# Patient Record
Sex: FEMALE | Race: OTHER | URBAN - METROPOLITAN AREA
[De-identification: names, ages, dates, MRNs, and addresses within clinical notes are randomized per-mention and may not be internally consistent; named-entity substitution may affect disease eponyms.]

---

## 2018-01-25 ENCOUNTER — INPATIENT (INPATIENT)
Facility: HOSPITAL | Age: 58
LOS: 0 days | Discharge: HOME | DRG: 311 | End: 2018-01-26
Attending: INTERNAL MEDICINE | Admitting: INTERNAL MEDICINE
Payer: COMMERCIAL

## 2018-01-25 VITALS
SYSTOLIC BLOOD PRESSURE: 132 MMHG | HEART RATE: 69 BPM | DIASTOLIC BLOOD PRESSURE: 86 MMHG | RESPIRATION RATE: 20 BRPM | WEIGHT: 278 LBS | OXYGEN SATURATION: 97 % | HEIGHT: 62 IN | TEMPERATURE: 97 F

## 2018-01-25 DIAGNOSIS — E11.9 TYPE 2 DIABETES MELLITUS WITHOUT COMPLICATIONS: ICD-10-CM

## 2018-01-25 DIAGNOSIS — K21.9 GASTRO-ESOPHAGEAL REFLUX DISEASE WITHOUT ESOPHAGITIS: ICD-10-CM

## 2018-01-25 DIAGNOSIS — I20.0 UNSTABLE ANGINA: ICD-10-CM

## 2018-01-25 DIAGNOSIS — I10 ESSENTIAL (PRIMARY) HYPERTENSION: ICD-10-CM

## 2018-01-25 DIAGNOSIS — F17.210 NICOTINE DEPENDENCE, CIGARETTES, UNCOMPLICATED: ICD-10-CM

## 2018-01-25 DIAGNOSIS — Z90.49 ACQUIRED ABSENCE OF OTHER SPECIFIED PARTS OF DIGESTIVE TRACT: Chronic | ICD-10-CM

## 2018-01-25 DIAGNOSIS — Z96.659 PRESENCE OF UNSPECIFIED ARTIFICIAL KNEE JOINT: Chronic | ICD-10-CM

## 2018-01-25 LAB
ALBUMIN SERPL ELPH-MCNC: 3.9 G/DL — SIGNIFICANT CHANGE UP (ref 3.3–5)
ALP SERPL-CCNC: 71 U/L — SIGNIFICANT CHANGE UP (ref 40–120)
ALT FLD-CCNC: 17 U/L — SIGNIFICANT CHANGE UP (ref 10–45)
ANION GAP SERPL CALC-SCNC: 14 MMOL/L — SIGNIFICANT CHANGE UP (ref 5–17)
AST SERPL-CCNC: 15 U/L — SIGNIFICANT CHANGE UP (ref 10–40)
BASOPHILS NFR BLD AUTO: 0.5 % — SIGNIFICANT CHANGE UP (ref 0–2)
BILIRUB SERPL-MCNC: 0.2 MG/DL — SIGNIFICANT CHANGE UP (ref 0.2–1.2)
BUN SERPL-MCNC: 25 MG/DL — HIGH (ref 7–23)
CALCIUM SERPL-MCNC: 9.4 MG/DL — SIGNIFICANT CHANGE UP (ref 8.4–10.5)
CHLORIDE SERPL-SCNC: 97 MMOL/L — SIGNIFICANT CHANGE UP (ref 96–108)
CO2 SERPL-SCNC: 30 MMOL/L — SIGNIFICANT CHANGE UP (ref 22–31)
CREAT SERPL-MCNC: 1.05 MG/DL — SIGNIFICANT CHANGE UP (ref 0.5–1.3)
EOSINOPHIL NFR BLD AUTO: 1.4 % — SIGNIFICANT CHANGE UP (ref 0–6)
EXTRA BLUE TOP TUBE: SIGNIFICANT CHANGE UP
GLUCOSE SERPL-MCNC: 100 MG/DL — HIGH (ref 70–99)
HCT VFR BLD CALC: 42.4 % — SIGNIFICANT CHANGE UP (ref 34.5–45)
HGB BLD-MCNC: 14.1 G/DL — SIGNIFICANT CHANGE UP (ref 11.5–15.5)
LYMPHOCYTES # BLD AUTO: 23.6 % — SIGNIFICANT CHANGE UP (ref 13–44)
MCHC RBC-ENTMCNC: 29.4 PG — SIGNIFICANT CHANGE UP (ref 27–34)
MCHC RBC-ENTMCNC: 33.3 G/DL — SIGNIFICANT CHANGE UP (ref 32–36)
MCV RBC AUTO: 88.5 FL — SIGNIFICANT CHANGE UP (ref 80–100)
MONOCYTES NFR BLD AUTO: 7.8 % — SIGNIFICANT CHANGE UP (ref 2–14)
NEUTROPHILS NFR BLD AUTO: 66.7 % — SIGNIFICANT CHANGE UP (ref 43–77)
PLATELET # BLD AUTO: 269 K/UL — SIGNIFICANT CHANGE UP (ref 150–400)
POTASSIUM SERPL-MCNC: 3.6 MMOL/L — SIGNIFICANT CHANGE UP (ref 3.5–5.3)
POTASSIUM SERPL-SCNC: 3.6 MMOL/L — SIGNIFICANT CHANGE UP (ref 3.5–5.3)
PROT SERPL-MCNC: 7.8 G/DL — SIGNIFICANT CHANGE UP (ref 6–8.3)
RBC # BLD: 4.79 M/UL — SIGNIFICANT CHANGE UP (ref 3.8–5.2)
RBC # FLD: 13.1 % — SIGNIFICANT CHANGE UP (ref 10.3–16.9)
SODIUM SERPL-SCNC: 141 MMOL/L — SIGNIFICANT CHANGE UP (ref 135–145)
TROPONIN T SERPL-MCNC: <0.01 NG/ML — SIGNIFICANT CHANGE UP (ref 0–0.01)
WBC # BLD: 9.7 K/UL — SIGNIFICANT CHANGE UP (ref 3.8–10.5)
WBC # FLD AUTO: 9.7 K/UL — SIGNIFICANT CHANGE UP (ref 3.8–10.5)

## 2018-01-25 PROCEDURE — 93010 ELECTROCARDIOGRAM REPORT: CPT

## 2018-01-25 PROCEDURE — 99285 EMERGENCY DEPT VISIT HI MDM: CPT | Mod: 25

## 2018-01-25 PROCEDURE — 99222 1ST HOSP IP/OBS MODERATE 55: CPT | Mod: AI

## 2018-01-25 RX ORDER — SODIUM CHLORIDE 9 MG/ML
1000 INJECTION INTRAMUSCULAR; INTRAVENOUS; SUBCUTANEOUS
Qty: 0 | Refills: 0 | Status: DISCONTINUED | OUTPATIENT
Start: 2018-01-25 | End: 2018-01-26

## 2018-01-25 RX ORDER — HEPARIN SODIUM 5000 [USP'U]/ML
5000 INJECTION INTRAVENOUS; SUBCUTANEOUS ONCE
Qty: 0 | Refills: 0 | Status: COMPLETED | OUTPATIENT
Start: 2018-01-25 | End: 2018-01-25

## 2018-01-25 RX ORDER — HEPARIN SODIUM 5000 [USP'U]/ML
INJECTION INTRAVENOUS; SUBCUTANEOUS
Qty: 25000 | Refills: 0 | Status: DISCONTINUED | OUTPATIENT
Start: 2018-01-25 | End: 2018-01-26

## 2018-01-25 RX ORDER — INSULIN LISPRO 100/ML
VIAL (ML) SUBCUTANEOUS
Qty: 0 | Refills: 0 | Status: DISCONTINUED | OUTPATIENT
Start: 2018-01-25 | End: 2018-01-26

## 2018-01-25 RX ORDER — SODIUM CHLORIDE 9 MG/ML
1000 INJECTION, SOLUTION INTRAVENOUS
Qty: 0 | Refills: 0 | Status: DISCONTINUED | OUTPATIENT
Start: 2018-01-25 | End: 2018-01-26

## 2018-01-25 RX ORDER — HYDROCHLOROTHIAZIDE 25 MG
12.5 TABLET ORAL DAILY
Qty: 0 | Refills: 0 | Status: DISCONTINUED | OUTPATIENT
Start: 2018-01-25 | End: 2018-01-26

## 2018-01-25 RX ORDER — GLUCAGON INJECTION, SOLUTION 0.5 MG/.1ML
1 INJECTION, SOLUTION SUBCUTANEOUS ONCE
Qty: 0 | Refills: 0 | Status: DISCONTINUED | OUTPATIENT
Start: 2018-01-25 | End: 2018-01-26

## 2018-01-25 RX ORDER — CHLORTHALIDONE 50 MG
50 TABLET ORAL ONCE
Qty: 0 | Refills: 0 | Status: COMPLETED | OUTPATIENT
Start: 2018-01-25 | End: 2018-01-26

## 2018-01-25 RX ORDER — PANTOPRAZOLE SODIUM 20 MG/1
40 TABLET, DELAYED RELEASE ORAL
Qty: 0 | Refills: 0 | Status: DISCONTINUED | OUTPATIENT
Start: 2018-01-25 | End: 2018-01-26

## 2018-01-25 RX ORDER — NITROGLYCERIN 6.5 MG
0.4 CAPSULE, EXTENDED RELEASE ORAL ONCE
Qty: 0 | Refills: 0 | Status: COMPLETED | OUTPATIENT
Start: 2018-01-25 | End: 2018-01-25

## 2018-01-25 RX ORDER — SITAGLIPTIN 50 MG/1
1 TABLET, FILM COATED ORAL
Qty: 0 | Refills: 0 | COMMUNITY

## 2018-01-25 RX ORDER — TICAGRELOR 90 MG/1
180 TABLET ORAL ONCE
Qty: 0 | Refills: 0 | Status: COMPLETED | OUTPATIENT
Start: 2018-01-25 | End: 2018-01-25

## 2018-01-25 RX ORDER — NEBIVOLOL HYDROCHLORIDE 5 MG/1
10 TABLET ORAL ONCE
Qty: 0 | Refills: 0 | Status: COMPLETED | OUTPATIENT
Start: 2018-01-25 | End: 2018-01-26

## 2018-01-25 RX ORDER — HEPARIN SODIUM 5000 [USP'U]/ML
5000 INJECTION INTRAVENOUS; SUBCUTANEOUS EVERY 8 HOURS
Qty: 0 | Refills: 0 | Status: DISCONTINUED | OUTPATIENT
Start: 2018-01-25 | End: 2018-01-26

## 2018-01-25 RX ORDER — CHLORTHALIDONE 50 MG
1 TABLET ORAL
Qty: 0 | Refills: 0 | COMMUNITY

## 2018-01-25 RX ORDER — ESOMEPRAZOLE MAGNESIUM 40 MG/1
1 CAPSULE, DELAYED RELEASE ORAL
Qty: 0 | Refills: 0 | COMMUNITY

## 2018-01-25 RX ORDER — ASPIRIN/CALCIUM CARB/MAGNESIUM 324 MG
325 TABLET ORAL ONCE
Qty: 0 | Refills: 0 | Status: COMPLETED | OUTPATIENT
Start: 2018-01-25 | End: 2018-01-25

## 2018-01-25 RX ORDER — DEXTROSE 50 % IN WATER 50 %
25 SYRINGE (ML) INTRAVENOUS ONCE
Qty: 0 | Refills: 0 | Status: DISCONTINUED | OUTPATIENT
Start: 2018-01-25 | End: 2018-01-26

## 2018-01-25 RX ORDER — DEXTROSE 50 % IN WATER 50 %
12.5 SYRINGE (ML) INTRAVENOUS ONCE
Qty: 0 | Refills: 0 | Status: DISCONTINUED | OUTPATIENT
Start: 2018-01-25 | End: 2018-01-26

## 2018-01-25 RX ORDER — NEBIVOLOL HYDROCHLORIDE 5 MG/1
1 TABLET ORAL
Qty: 0 | Refills: 0 | COMMUNITY

## 2018-01-25 RX ORDER — DEXTROSE 50 % IN WATER 50 %
1 SYRINGE (ML) INTRAVENOUS ONCE
Qty: 0 | Refills: 0 | Status: DISCONTINUED | OUTPATIENT
Start: 2018-01-25 | End: 2018-01-26

## 2018-01-25 RX ORDER — LISINOPRIL 2.5 MG/1
20 TABLET ORAL DAILY
Qty: 0 | Refills: 0 | Status: DISCONTINUED | OUTPATIENT
Start: 2018-01-25 | End: 2018-01-26

## 2018-01-25 RX ADMIN — HEPARIN SODIUM 5000 UNIT(S): 5000 INJECTION INTRAVENOUS; SUBCUTANEOUS at 20:51

## 2018-01-25 RX ADMIN — HEPARIN SODIUM 1000 UNIT(S)/HR: 5000 INJECTION INTRAVENOUS; SUBCUTANEOUS at 20:52

## 2018-01-25 RX ADMIN — SODIUM CHLORIDE 75 MILLILITER(S): 9 INJECTION INTRAMUSCULAR; INTRAVENOUS; SUBCUTANEOUS at 21:41

## 2018-01-25 RX ADMIN — Medication 325 MILLIGRAM(S): at 20:25

## 2018-01-25 RX ADMIN — Medication 0.4 MILLIGRAM(S): at 20:25

## 2018-01-25 RX ADMIN — TICAGRELOR 180 MILLIGRAM(S): 90 TABLET ORAL at 20:26

## 2018-01-25 NOTE — H&P ADULT - PROBLEM SELECTOR PLAN 2
Monitor BP. Cont. lisinopril 20mg HCTZ 12.5mg PO daily, Bystolic 10mg and Chlorthaladone 50mg PO daily (will need clearance from attending and pharmacy in AM according to night pharmacist)  Bystolic and Chlorthaladone ordered by pharmacy one dose.  Please follow up.

## 2018-01-25 NOTE — H&P ADULT - PMH
GERD (gastroesophageal reflux disease)    HTN (hypertension)    Obesity    Pre-diabetes    Tobacco dependence due to cigarettes

## 2018-01-25 NOTE — ED PROVIDER NOTE - OBJECTIVE STATEMENT
57 yof pw cp x 1 wk, intermittent, unprovoked, sternal, rad to L arm, today had cp that waxes and wanes.  seen by PMD and cards, sent here for further eval.  hx of DM and HTN.  no prior stents or known CAD.  +sob.

## 2018-01-25 NOTE — H&P ADULT - REASON FOR ADMISSION
Unstable Angina symptoms X1 week. (Intermittent, non exertional chest pressure radiating down left arm associated with SOB.

## 2018-01-25 NOTE — H&P ADULT - NSHPSOCIALHISTORY_GEN_ALL_CORE
Single lives in [place];  Admits to tobacco use 1/2 pack a week X20 yrs.  Denies EtOH and illicit drug use.

## 2018-01-25 NOTE — ED PROVIDER NOTE - MEDICAL DECISION MAKING DETAILS
cp, likely unstable angina, d/w cardiology team, planned cath vs ct coronary in the AM, agree w/ brillinta and heparin, asa, nitro prn, admission (per Dr. Chavez)

## 2018-01-25 NOTE — H&P ADULT - HISTORY OF PRESENT ILLNESS
56 y/o obese female, current smoker, with PMHx of HTN, DM-2 and GERD presents to [facility] ER this evening, 1/25/18, complaining of intermittent, non exertional, mid sternal pressure radiating down left arm, with hand numbness/tingling associated with SOB X1 week.  According to patient, she has been experiencing intermittent, exertional chest pressure with lifting bags or walking a 4-5 blocks w/SOB for one month but has ignored her symptoms.  Last week symptoms are now occurring intermittently at rest with chest pressure now radiating down left arm with hand numbness and tingling sensation.  Pt. states today she was driving to work when on the car radio a physician was talking about symptoms to watch out for MI.  Pt. was nervous since she has been experiencing same symptoms and called her PMD who then advised her to go ER for evaluation.  Pt. reports last stress test was done one year ago because she needed cardiac clearance for gastric sleeve and results were normal.  However, surgery was cancelled because patient continue to smoke making her high risk for surgery.  In ER ECG reveals NSR@67bpm with non specific ST-T wave changes, CE neg X1, CXR reveals no acute pathology f/u official report. Pt. was loaded with Aspirin Ecand Brilinta in case pt. need to go for Cath (depending on Troponin) Pt. admitted to [facility] w/Unstable Angina pt. is NPO for CTA of coronaries (if Troponin remains negative) if Troponin increases then cardiac cath.  F/U labs in AM

## 2018-01-25 NOTE — H&P ADULT - PROBLEM SELECTOR PLAN 1
NPO for CTA of Cornonaries and Echocardiogram if Troponin remains negative.  If Troponins increase then Cardiac Cath.  F/U in AM  Pt. was loaded in ER with Aspirin Ec and Brilinta

## 2018-01-25 NOTE — H&P ADULT - NSHPPHYSICALEXAM_GEN_ALL_CORE
T(C): 36.6 (01-25-18 @ 22:56), Max: 36.6 (01-25-18 @ 22:56)  HR: 69 (01-25-18 @ 22:56) (69 - 69)  BP: 113/69 (01-25-18 @ 22:56) (113/69 - 164/84)  RR: 16 (01-25-18 @ 22:56) (16 - 20)  SpO2: 96% (01-25-18 @ 22:56) (96% - 97%)  Wt(kg): --    Appearance: Normal	  HEENT:   Normal oral mucosa, PERRL, EOMI	  Neck: Supple, - JVD; No Carotid Bruit and 2+ pulses B/L  Cardiovascular: Normal S1 S2, No JVD, No murmurs  Respiratory: Lungs clear to auscultation/No Rales, Rhonchi, Wheezing	  Gastrointestinal:  Soft, Non-tender, + BS X4	  Skin: No rashes, No ecchymoses, No cyanosis  Extremities: Normal range of motion, No clubbing, cyanosis or edema  Vascular: Femoral pulses 2+ b/l without bruit, DP 1+ b/l, PT 1+ b/l  Neurologic: Non-focal  Psychiatry: A & O x 3, Mood & affect appropriate

## 2018-01-25 NOTE — H&P ADULT - ASSESSMENT
58 y/o obese female, current smoker, with PMHx of HTN, DM-2 and GERD presents to [facility] ER this evening, 1/25/18, complaining of intermittent, non exertional, mid sternal pressure radiating down left arm, with hand numbness/tingling associated with SOB X1 week. Pt. admitted to [facility] w/Unstable Angina pt. is NPO for CTA of coronaries (if Troponin remains negative) if Troponin increases then cardiac cath.  F/U labs in AM

## 2018-01-25 NOTE — H&P ADULT - PROBLEM SELECTOR PLAN 3
Monitor FS.  Hold oral Januvia 50mg PO daily for further test.  Continue with Insulin Lispro Medium dose sliding scale.

## 2018-01-25 NOTE — H&P ADULT - NSHPREVIEWOFSYSTEMS_GEN_ALL_CORE
GENERAL, CONSTITUTIONAL : denies recent weight loss, fever, chills  EYES, VISION: denies changes in vision   EARS, NOSE, THROAT: denies hearing loss  HEART, CARDIOVASCULAR: admits to chest pressure "heavy" denies palpitations arrhythmia  RESPIRATORY: admits to SOB w/chest pressure; Denies cough  wheezing, PND, orthopnea  GASTROINTESTINAL: Denies abdominal pain, heartburn, bloody stool, dark tarry stool  GENITOURINARY: Denies frequent urination, urgency  MUSCULOSKELETAL denies joint pain or swelling, restricted motion, musculoskeletal pain.   SKIN & INTEGUMENTARY Denies rashes, sores, blisters, blisters, growths.  NEUROLOGICAL: admits to left lateral leg occasional numbness (2/2 to nerve impingement) left hand tingling sensations, denies sensation loss, burning.   PSYCHIATRIC: Denies nervousness, anxiety, depression  ENDOCRINE Denies heat or cold intolerance, excessive thirst  HEMATOLOGIC/LYMPHATIC: Denies abnormal bleeding, bleeding of any kind

## 2018-01-25 NOTE — ED ADULT NURSE NOTE - OBJECTIVE STATEMENT
Pt w hx of HTN and pre-diabetes referred to ED to meet Dr Chavez and be admitted for cardiac cath due to pt c/o chest pain radiating to her Left arm, palpitations and SOB/AJ x one week, worse today. Pt reports having a  stress test and echo but unsure when and if normal. EKG obtained, labs drawn, IV access established. Pt denies taking any Nitro PTA.

## 2018-01-25 NOTE — H&P ADULT - NSHPLABSRESULTS_GEN_ALL_CORE
14.1   9.7   )-----------( 269      ( 25 Jan 2018 19:26 )             42.4       01-25    141  |  97  |  25<H>  ----------------------------<  100<H>  3.6   |  30  |  1.05    Ca    9.4      25 Jan 2018 19:26    TPro  7.8  /  Alb  3.9  /  TBili  0.2  /  DBili  x   /  AST  15  /  ALT  17  /  AlkPhos  71  01-25      PT/INR - ( 25 Jan 2018 19:26 )   PT: 12.2 sec;   INR: 1.10          PTT - ( 25 Jan 2018 19:26 )  PTT:30.7 sec    CARDIAC MARKERS ( 25 Jan 2018 19:26 )  x     / <0.01 ng/mL / x     / x     / x                EKG: NSR @67bpm with non specific ST-T wave changes.

## 2018-01-25 NOTE — ED PROVIDER NOTE - PROGRESS NOTE DETAILS
ekg reviewed w/ cardiology, also previous ekg reviewed, will admit to tele w/ planned cath in the AM, if pt sx changes or w/ ekg changes, will call back Dr. Chavez [phone] trop neg, d/w cardiology, can dc heparin for now, keep trending overnight.  repeat ekg nonspecific st findings. case d/w cardiology, given sx, ?unstable angina, nonspecific ST findings, tentative plan cath vs ct coronary in the AM, will load heparin and brillinta, if trop positive, will call back Dr. Chavez, if neg, can plan for CTA coronary in the AM, Dr. Chavez [phone]

## 2018-01-26 VITALS
SYSTOLIC BLOOD PRESSURE: 114 MMHG | RESPIRATION RATE: 18 BRPM | TEMPERATURE: 98 F | OXYGEN SATURATION: 97 % | DIASTOLIC BLOOD PRESSURE: 73 MMHG | HEART RATE: 63 BPM

## 2018-01-26 LAB
APTT BLD: 30.8 SEC — SIGNIFICANT CHANGE UP (ref 27.5–37.4)
APTT BLD: 66.4 SEC — HIGH (ref 27.5–37.4)
CK MB CFR SERPL CALC: 1.3 NG/ML — SIGNIFICANT CHANGE UP (ref 0–6.7)
CK SERPL-CCNC: 115 U/L — SIGNIFICANT CHANGE UP (ref 25–170)
GLUCOSE BLDC GLUCOMTR-MCNC: 110 MG/DL — HIGH (ref 70–99)
GLUCOSE BLDC GLUCOMTR-MCNC: 153 MG/DL — HIGH (ref 70–99)
GLUCOSE BLDC GLUCOMTR-MCNC: 164 MG/DL — HIGH (ref 70–99)
HCT VFR BLD CALC: 39.2 % — SIGNIFICANT CHANGE UP (ref 34.5–45)
HCT VFR BLD CALC: 42.2 % — SIGNIFICANT CHANGE UP (ref 34.5–45)
HGB BLD-MCNC: 13.1 G/DL — SIGNIFICANT CHANGE UP (ref 11.5–15.5)
HGB BLD-MCNC: 14.2 G/DL — SIGNIFICANT CHANGE UP (ref 11.5–15.5)
MCHC RBC-ENTMCNC: 29.5 PG — SIGNIFICANT CHANGE UP (ref 27–34)
MCHC RBC-ENTMCNC: 29.9 PG — SIGNIFICANT CHANGE UP (ref 27–34)
MCHC RBC-ENTMCNC: 33.4 G/DL — SIGNIFICANT CHANGE UP (ref 32–36)
MCHC RBC-ENTMCNC: 33.6 G/DL — SIGNIFICANT CHANGE UP (ref 32–36)
MCV RBC AUTO: 88.3 FL — SIGNIFICANT CHANGE UP (ref 80–100)
MCV RBC AUTO: 88.8 FL — SIGNIFICANT CHANGE UP (ref 80–100)
PLATELET # BLD AUTO: 225 K/UL — SIGNIFICANT CHANGE UP (ref 150–400)
PLATELET # BLD AUTO: 247 K/UL — SIGNIFICANT CHANGE UP (ref 150–400)
RBC # BLD: 4.44 M/UL — SIGNIFICANT CHANGE UP (ref 3.8–5.2)
RBC # BLD: 4.75 M/UL — SIGNIFICANT CHANGE UP (ref 3.8–5.2)
RBC # FLD: 13.2 % — SIGNIFICANT CHANGE UP (ref 10.3–16.9)
RBC # FLD: 13.2 % — SIGNIFICANT CHANGE UP (ref 10.3–16.9)
TROPONIN T SERPL-MCNC: <0.01 NG/ML — SIGNIFICANT CHANGE UP (ref 0–0.01)
WBC # BLD: 7.6 K/UL — SIGNIFICANT CHANGE UP (ref 3.8–10.5)
WBC # BLD: 9.4 K/UL — SIGNIFICANT CHANGE UP (ref 3.8–10.5)
WBC # FLD AUTO: 7.6 K/UL — SIGNIFICANT CHANGE UP (ref 3.8–10.5)
WBC # FLD AUTO: 9.4 K/UL — SIGNIFICANT CHANGE UP (ref 3.8–10.5)

## 2018-01-26 PROCEDURE — 99238 HOSP IP/OBS DSCHRG MGMT 30/<: CPT

## 2018-01-26 PROCEDURE — 93306 TTE W/DOPPLER COMPLETE: CPT | Mod: 26

## 2018-01-26 PROCEDURE — 75574 CT ANGIO HRT W/3D IMAGE: CPT | Mod: 26

## 2018-01-26 RX ORDER — HEPARIN SODIUM 5000 [USP'U]/ML
6000 INJECTION INTRAVENOUS; SUBCUTANEOUS ONCE
Qty: 0 | Refills: 0 | Status: COMPLETED | OUTPATIENT
Start: 2018-01-26 | End: 2018-01-26

## 2018-01-26 RX ORDER — ASPIRIN/CALCIUM CARB/MAGNESIUM 324 MG
1 TABLET ORAL
Qty: 0 | Refills: 0 | COMMUNITY

## 2018-01-26 RX ORDER — HEPARIN SODIUM 5000 [USP'U]/ML
1300 INJECTION INTRAVENOUS; SUBCUTANEOUS
Qty: 25000 | Refills: 0 | Status: DISCONTINUED | OUTPATIENT
Start: 2018-01-26 | End: 2018-01-26

## 2018-01-26 RX ADMIN — Medication 2: at 07:05

## 2018-01-26 RX ADMIN — LISINOPRIL 20 MILLIGRAM(S): 2.5 TABLET ORAL at 07:05

## 2018-01-26 RX ADMIN — Medication 12.5 MILLIGRAM(S): at 07:05

## 2018-01-26 RX ADMIN — HEPARIN SODIUM 1300 UNIT(S)/HR: 5000 INJECTION INTRAVENOUS; SUBCUTANEOUS at 05:02

## 2018-01-26 RX ADMIN — NEBIVOLOL HYDROCHLORIDE 10 MILLIGRAM(S): 5 TABLET ORAL at 07:34

## 2018-01-26 RX ADMIN — Medication 50 MILLIGRAM(S): at 07:34

## 2018-01-26 RX ADMIN — HEPARIN SODIUM 6000 UNIT(S): 5000 INJECTION INTRAVENOUS; SUBCUTANEOUS at 05:01

## 2018-01-26 NOTE — DISCHARGE NOTE ADULT - MEDICATION SUMMARY - MEDICATIONS TO TAKE
I will START or STAY ON the medications listed below when I get home from the hospital:    Aspirin Enteric Coated 81 mg oral delayed release tablet  -- 1 tab(s) by mouth once a day  -- Indication: For Heart    Januvia 50 mg oral tablet  -- 1 tab(s) by mouth once a day  -- Indication: For Diabetes Mellitus Type II    lisinopril-hydroCHLOROthiazide 20 mg-12.5 mg oral tablet  -- 1 tab(s) by mouth once a day  -- Indication: For Hypertension (Blood Pressure)    Bystolic 10 mg oral tablet  -- 1 tab(s) by mouth once a day  -- Indication: For Hypertension (Blood Pressure)    chlorthalidone 50 mg oral tablet  -- 1 tab(s) by mouth once a day  -- Indication: For Hypertension (Blood Pressure)    esomeprazole 40 mg oral delayed release capsule  -- 1 cap(s) by mouth once a day  -- Indication: For Stomach protection

## 2018-01-26 NOTE — DISCHARGE NOTE ADULT - PATIENT PORTAL LINK FT
“You can access the [employer] Patient Portal, offered by [facility], by registering with the following website: [email or web address]”

## 2018-01-26 NOTE — DISCHARGE NOTE ADULT - PLAN OF CARE
Maintain Low Fat, Low Cholesterol Diet, and Low Salt Diet. You were admitted with chest pain. Your heart enzymes were negative for heart attack. Your echocardiogram was normal. Your CT Scan of your heart did not show significant blockages. You should take a Baby Aspirin daily. Follow-up with Dr. Rios in 1-2 weeks. You were admitted with chest pain. Your heart enzymes were negative for heart attack. Your echocardiogram was normal. Your CT Scan of your heart did not show significant blockages. You should take a Baby Aspirin daily. Follow-up with Dr. Rios in 1-2 weeks. If you develop chest pain, shortness of breath go to the nearest ER immediately. STOP SMOKING. IT IS ONE OF THE BEST THINGS YOU CAN DO FOR YOUR HEALTH. Monitor BP . Maintain Low Salt Diet (Less than 2 g daily). Continue Lisinopril/HCTZ, Bisoprolol and Chlorthalidone. Monitor Fingersticks. Continue Januvia.

## 2018-01-26 NOTE — DISCHARGE NOTE ADULT - CARE PLAN
Principal Discharge DX:	Unstable angina  Goal:	Maintain Low Fat, Low Cholesterol Diet, and Low Salt Diet.  Assessment and plan of treatment:	You were admitted with chest pain. Your heart enzymes were negative for heart attack. Your echocardiogram was normal. Your CT Scan of your heart did not show significant blockages. You should take a Baby Aspirin daily. Follow-up with Dr. Rios in 1-2 weeks.  Secondary Diagnosis:	HTN (hypertension)  Secondary Diagnosis:	DM2 (diabetes mellitus, type 2) Principal Discharge DX:	Unstable angina  Goal:	Maintain Low Fat, Low Cholesterol Diet, and Low Salt Diet.  Assessment and plan of treatment:	You were admitted with chest pain. Your heart enzymes were negative for heart attack. Your echocardiogram was normal. Your CT Scan of your heart did not show significant blockages. You should take a Baby Aspirin daily. Follow-up with Dr. Rios in 1-2 weeks. If you develop chest pain, shortness of breath go to the nearest ER immediately. STOP SMOKING. IT IS ONE OF THE BEST THINGS YOU CAN DO FOR YOUR HEALTH.  Secondary Diagnosis:	HTN (hypertension)  Assessment and plan of treatment:	Monitor BP . Maintain Low Salt Diet (Less than 2 g daily). Continue Lisinopril/HCTZ, Bisoprolol and Chlorthalidone.  Secondary Diagnosis:	DM2 (diabetes mellitus, type 2)  Assessment and plan of treatment:	Monitor Fingersticks. Continue Januvia.

## 2018-02-02 PROCEDURE — 93306 TTE W/DOPPLER COMPLETE: CPT

## 2018-02-02 PROCEDURE — 85610 PROTHROMBIN TIME: CPT

## 2018-02-02 PROCEDURE — 75574 CT ANGIO HRT W/3D IMAGE: CPT

## 2018-02-02 PROCEDURE — 82553 CREATINE MB FRACTION: CPT

## 2018-02-02 PROCEDURE — 84484 ASSAY OF TROPONIN QUANT: CPT

## 2018-02-02 PROCEDURE — 83880 ASSAY OF NATRIURETIC PEPTIDE: CPT

## 2018-02-02 PROCEDURE — 80053 COMPREHEN METABOLIC PANEL: CPT

## 2018-02-02 PROCEDURE — 85027 COMPLETE CBC AUTOMATED: CPT

## 2018-02-02 PROCEDURE — 99285 EMERGENCY DEPT VISIT HI MDM: CPT | Mod: 25

## 2018-02-02 PROCEDURE — 93005 ELECTROCARDIOGRAM TRACING: CPT | Mod: 76

## 2018-02-02 PROCEDURE — 82962 GLUCOSE BLOOD TEST: CPT

## 2018-02-02 PROCEDURE — 85025 COMPLETE CBC W/AUTO DIFF WBC: CPT

## 2018-02-02 PROCEDURE — 82550 ASSAY OF CK (CPK): CPT

## 2018-02-02 PROCEDURE — 96374 THER/PROPH/DIAG INJ IV PUSH: CPT

## 2018-02-02 PROCEDURE — 36415 COLL VENOUS BLD VENIPUNCTURE: CPT

## 2018-02-02 PROCEDURE — 85730 THROMBOPLASTIN TIME PARTIAL: CPT

## 2018-02-04 DIAGNOSIS — I10 ESSENTIAL (PRIMARY) HYPERTENSION: ICD-10-CM

## 2018-02-04 DIAGNOSIS — I20.0 UNSTABLE ANGINA: ICD-10-CM

## 2018-02-04 DIAGNOSIS — E11.9 TYPE 2 DIABETES MELLITUS WITHOUT COMPLICATIONS: ICD-10-CM

## 2018-02-04 DIAGNOSIS — K21.9 GASTRO-ESOPHAGEAL REFLUX DISEASE WITHOUT ESOPHAGITIS: ICD-10-CM

## 2018-02-04 DIAGNOSIS — F17.210 NICOTINE DEPENDENCE, CIGARETTES, UNCOMPLICATED: ICD-10-CM
